# Patient Record
Sex: MALE | Race: WHITE
[De-identification: names, ages, dates, MRNs, and addresses within clinical notes are randomized per-mention and may not be internally consistent; named-entity substitution may affect disease eponyms.]

---

## 2023-02-20 ENCOUNTER — HOSPITAL ENCOUNTER (OUTPATIENT)
Dept: HOSPITAL 19 - SDCO | Age: 53
Discharge: HOME | End: 2023-02-20
Attending: SURGERY
Payer: COMMERCIAL

## 2023-02-20 VITALS — TEMPERATURE: 98 F | SYSTOLIC BLOOD PRESSURE: 122 MMHG | HEART RATE: 72 BPM | DIASTOLIC BLOOD PRESSURE: 91 MMHG

## 2023-02-20 VITALS — WEIGHT: 169.98 LBS | BODY MASS INDEX: 22.53 KG/M2 | HEIGHT: 73 IN

## 2023-02-20 VITALS — SYSTOLIC BLOOD PRESSURE: 109 MMHG | HEART RATE: 81 BPM | DIASTOLIC BLOOD PRESSURE: 70 MMHG

## 2023-02-20 VITALS — HEART RATE: 64 BPM | TEMPERATURE: 98.6 F | SYSTOLIC BLOOD PRESSURE: 118 MMHG | DIASTOLIC BLOOD PRESSURE: 85 MMHG

## 2023-02-20 VITALS — SYSTOLIC BLOOD PRESSURE: 113 MMHG | HEART RATE: 80 BPM | DIASTOLIC BLOOD PRESSURE: 69 MMHG

## 2023-02-20 DIAGNOSIS — K43.6: Primary | ICD-10-CM

## 2023-02-20 DIAGNOSIS — F17.220: ICD-10-CM

## 2023-02-20 PROCEDURE — C1781 MESH (IMPLANTABLE): HCPCS

## 2023-02-20 NOTE — NUR
1445 PATIENT RETURNS TO ROOM 6 VIA CART. PATIENT IS ALERT AND ORIENTED, VERY
PLEASANT. RESPIRATIONS EVEN AND UNLABORED. VITAL SIGNS OBTAINED. 3 INCISION
SITES TO ABDOMEN WITH GLUE. ALL SITES CDI.
 
1500 PATIENT REQUESTED COFFEE. NO DIFFICULTIES SWALLOWING.
 
1530 DISCHARGE INSTRUCTIONS REVIEWED WITH PATIENT. PATIENT VERBALIZED
UNDERSTANDING WITH NO FURTHER QUESTIONS OR CONCERNS.
 
1535 DISCONTINUED IV FROM RIGHT HAND WITH NO DIFFICULTIES.
 
1540 PATIENT DRESSES SELF.
 
1545 PATIENT USES THE RESTROOM INDEPENDENTLY AND VOIDS WITH NO DIFFICULTY.
 
1550 PATIENT DISCHARGES VIA WHEELCHAIR.